# Patient Record
Sex: FEMALE | Race: WHITE | NOT HISPANIC OR LATINO | URBAN - METROPOLITAN AREA
[De-identification: names, ages, dates, MRNs, and addresses within clinical notes are randomized per-mention and may not be internally consistent; named-entity substitution may affect disease eponyms.]

---

## 2022-01-28 ENCOUNTER — OUTPATIENT (OUTPATIENT)
Dept: OUTPATIENT SERVICES | Facility: HOSPITAL | Age: 62
LOS: 1 days | Discharge: HOME | End: 2022-01-28
Payer: COMMERCIAL

## 2022-01-28 VITALS
TEMPERATURE: 98 F | RESPIRATION RATE: 18 BRPM | OXYGEN SATURATION: 97 % | WEIGHT: 269.18 LBS | DIASTOLIC BLOOD PRESSURE: 78 MMHG | HEART RATE: 77 BPM | HEIGHT: 68 IN | SYSTOLIC BLOOD PRESSURE: 156 MMHG

## 2022-01-28 DIAGNOSIS — S93.125A DISLOCATION OF METATARSOPHALANGEAL JOINT OF LEFT LESSER TOE(S), INITIAL ENCOUNTER: ICD-10-CM

## 2022-01-28 DIAGNOSIS — M79.672 PAIN IN LEFT FOOT: ICD-10-CM

## 2022-01-28 DIAGNOSIS — Z01.818 ENCOUNTER FOR OTHER PREPROCEDURAL EXAMINATION: ICD-10-CM

## 2022-01-28 DIAGNOSIS — Z96.641 PRESENCE OF RIGHT ARTIFICIAL HIP JOINT: Chronic | ICD-10-CM

## 2022-01-28 DIAGNOSIS — M20.12 HALLUX VALGUS (ACQUIRED), LEFT FOOT: ICD-10-CM

## 2022-01-28 DIAGNOSIS — M20.42 OTHER HAMMER TOE(S) (ACQUIRED), LEFT FOOT: ICD-10-CM

## 2022-01-28 DIAGNOSIS — Z96.642 PRESENCE OF LEFT ARTIFICIAL HIP JOINT: Chronic | ICD-10-CM

## 2022-01-28 DIAGNOSIS — Z98.51 TUBAL LIGATION STATUS: Chronic | ICD-10-CM

## 2022-01-28 LAB
ANION GAP SERPL CALC-SCNC: 2 MMOL/L — LOW (ref 5–17)
BUN SERPL-MCNC: 12 MG/DL — SIGNIFICANT CHANGE UP (ref 7–23)
CALCIUM SERPL-MCNC: 9.3 MG/DL — SIGNIFICANT CHANGE UP (ref 8.5–10.1)
CHLORIDE SERPL-SCNC: 108 MMOL/L — SIGNIFICANT CHANGE UP (ref 96–108)
CO2 SERPL-SCNC: 28 MMOL/L — SIGNIFICANT CHANGE UP (ref 22–31)
CREAT SERPL-MCNC: 0.53 MG/DL — SIGNIFICANT CHANGE UP (ref 0.5–1.3)
GLUCOSE SERPL-MCNC: 91 MG/DL — SIGNIFICANT CHANGE UP (ref 70–99)
HCT VFR BLD CALC: 34.5 % — SIGNIFICANT CHANGE UP (ref 34.5–45)
HGB BLD-MCNC: 11.5 G/DL — SIGNIFICANT CHANGE UP (ref 11.5–15.5)
MCHC RBC-ENTMCNC: 30.7 PG — SIGNIFICANT CHANGE UP (ref 27–34)
MCHC RBC-ENTMCNC: 33.3 G/DL — SIGNIFICANT CHANGE UP (ref 32–36)
MCV RBC AUTO: 92 FL — SIGNIFICANT CHANGE UP (ref 80–100)
NRBC # BLD: 0 /100 WBCS — SIGNIFICANT CHANGE UP (ref 0–0)
PLATELET # BLD AUTO: 245 K/UL — SIGNIFICANT CHANGE UP (ref 150–400)
POTASSIUM SERPL-MCNC: 4 MMOL/L — SIGNIFICANT CHANGE UP (ref 3.5–5.3)
POTASSIUM SERPL-SCNC: 4 MMOL/L — SIGNIFICANT CHANGE UP (ref 3.5–5.3)
RBC # BLD: 3.75 M/UL — LOW (ref 3.8–5.2)
RBC # FLD: 12.3 % — SIGNIFICANT CHANGE UP (ref 10.3–14.5)
SODIUM SERPL-SCNC: 138 MMOL/L — SIGNIFICANT CHANGE UP (ref 135–145)
WBC # BLD: 3.24 K/UL — LOW (ref 3.8–10.5)
WBC # FLD AUTO: 3.24 K/UL — LOW (ref 3.8–10.5)

## 2022-01-28 PROCEDURE — 93010 ELECTROCARDIOGRAM REPORT: CPT

## 2022-01-28 NOTE — H&P PST ADULT - HISTORY OF PRESENT ILLNESS
61 year old female with a past medical history of HTN c/o throbbing sharp pain to left foot, she is scheduled for a left foot Jose Alfredo/ Akin bunionectomy end digit weil osteotomy with fusions and flexor tendon transfer on 2/4/2022.    She denies fever, and sick contacts, and sick contacts.

## 2022-01-28 NOTE — H&P PST ADULT - NS NEC GEN PE MLT EXAM PC
[facility] [place] [facility]    [address]    [place] [address]    Phone:  [phone]       Thank You for choosing us for your health care visit. We are glad to serve you and happy to provide you with this summary of your visit. Please help us to ensure we have accurate records. If you find anything that needs to be changed, please let our staff know as soon as possible.          Your Demographic Information     Patient Name Sex Cassie Mathew Female 1954       Ethnic Group Patient Race    Not of  or  Origin White      Your Visit Details     Date & Time Provider Department    2017 3:30 PM Citlalli Anton NP [facility] [place] [facility]      Your Upcoming Appointment*(Max 10)       9:00 AM CDT   Complete Ophthalmology Exam with Shahla Vale OD   [facility] [place] [facility] ([facility])    [address]  [place] [id number] [phone]              3:00 PM CDT   Follow-up Visit with Le Campbell MD   [facility] ([facility])    [address]   [place] [address] [phone]            Wednesday May 31, 2017  8:40 AM CDT   Office Visit with Susie Pizano MD   [facility] [place] [facility] ([facility])    [address]  [place] [address] [phone]            2017  9:00 AM CDT   Behavioral Health Follow-Up with Kaia Verdugo MD   [facility] [place] [facility] ([facility] [place])    [address]  [place] [address]   [phone]            2017  7:05 AM CDT   Fasting Lab with [facility]   [facility] [place] [facility] ([facility])    [address]  [place] [address]   [phone]              8:30 AM CDT    No bruits; no thyromegaly or nodules   Office Visit with Citlalli Anton NP   [facility] [place] [facility] ([facility] [place])    [address]  [place] [address] [phone]              Your To Do List     Future Orders Please Complete On or Around Expires    COMPREHENSIVE METABOLIC PANEL  Jul 26, 2017 (Approximate) Aug 25, 2017    LIPID PANEL WITH REFLEX  Jul 26, 2017 (Approximate) Aug 25, 2017    Follow-Up    Return in about 6 months (around 7/27/2017) for follow up.      Conditions Discussed Today or Order-Related Diagnoses        Comments    Essential hypertension with goal blood pressure less than 140/90    -  Primary     Hypothyroidism due to Hashimoto's thyroiditis         Vitamin D deficiency         Mixed hyperlipidemia         Depressive disorder, not elsewhere classified         Mild intermittent asthma without complication         Obstructive sleep apnea (adult) (pediatric)         Irritable bowel syndrome without diarrhea         Gastroesophageal reflux disease, esophagitis presence not specified         Fibromyalgia         Sjogren's syndrome         Primary osteoarthritis involving multiple joints           Your Vitals Were     BP Pulse Resp Height Weight LMP    124/82 88 18 5' 6\" (1.676 m) 227 lb (103 kg) (LMP Unknown)    BMI Smoking Status                36.64 kg/m2 Never Smoker          Medications Prescribed or Re-Ordered Today     None      Your Current Medications Are        Disp Refills Start End    gabapentin (NEURONTIN) 100 MG capsule        Sig - Route: Take 100 mg by mouth 3 times daily. 200mg in am , 100mg after lunch, 100mg at bedtime - Oral    Class: Historical Med    vitamin - therapeutic multivitamins w/minerals (CENTRUM SILVER,THERA-M) Tab        Sig - Route: Take 1 tablet by mouth daily. - Oral    Class: Historical Med    lisinopril (PRINIVIL,ZESTRIL) 40 MG tablet 90 tablet 3 1/5/2017     Sig: TAKE 1 TABLET BY MOUTH DAILY    Class: Eprescribe    buPROPion (WELLBUTRIN)  100 MG tablet 75 tablet 2 1/4/2017     Sig: Take 1.5 in am and 1 tab nightly    Class: Eprescribe    sertraline (ZOLOFT) 100 MG tablet 135 tablet 1 1/4/2017     Sig - Route: Take 1.5 tablets by mouth daily. - Oral    Class: Eprescribe    LATUDA 40 MG tablet 30 tablet 2 12/6/2016     Sig: TAKE 1 TABLET BY MOUTH DAILY WITH BREAKFAST    Class: Eprescribe    levothyroxine (SYNTHROID, LEVOTHROID) 200 MCG tablet 90 tablet 1 12/2/2016     Sig - Route: Take 1 tablet by mouth daily. Brand name only - Oral    Class: Eprescribe    Valacyclovir HCl 1000 MG Tab 30 tablet 2 11/9/2016     Sig: TAKE 1 TABLET BY MOUTH DAILY    Class: Eprescribe    clobetasol (TEMOVATE) 0.05 % ointment 30 g 2 9/14/2016     Sig: Apply to affected area daily    Class: Eprescribe    cabergoline (DOSTINEX) 0.5 MG tablet 30 tablet 3 7/28/2016     Sig: Take 0.25 mg tablets by mouth twice weekly.    Class: Eprescribe    vitamin E 1000 UNITS capsule        Sig - Route: Take 1,000 Units by mouth daily. - Oral    Class: Historical Med    tiZANidine (ZANAFLEX) 4 MG tablet 90 tablet 1 7/26/2016     Sig - Route: Take 1 tablet by mouth every 6 hours as needed (spasm). - Oral    Class: Eprescribe    pravastatin (PRAVACHOL) 10 MG tablet 135 tablet 2 7/13/2016     Sig: TAKE 1 TABLET BY MOUTH DAILY ALTERNATING WITH 2 TABLETS BY MOUTH DAILY    Class: Eprescribe    simethicone (GAS-X) 80 MG chewable tablet 30 tablet 0 5/25/2016     Sig - Route: Chew 1 tablet by mouth every 6 hours as needed for Flatulence. - Oral    Class: Historical Med    docusate sodium (COLACE) 100 MG capsule 10 capsule 0 5/25/2016     Sig - Route: Take 1 capsule by mouth 2 times daily. - Oral    Class: Historical Med    hydroxychloroquine (PLAQUENIL) 200 MG tablet 180 tablet 3 5/25/2016     Sig - Route: Take 2 tablets by mouth daily. - Oral    Class: Eprescribe    verapamil 120 MG tablet 135 tablet 1 2/23/2016     Sig - Route: Take 240 mg by mouth nightly.  - Oral    Class: Historical Med    Notes  to Pharmacy: Per neurology    cetirizine (ZYRTEC) 10 MG tablet   4/8/2015     Sig - Route: Take 10 mg by mouth daily. Indications: as needed  - Oral    Class: Historical Med    albuterol (VENTOLIN HFA) 108 (90 BASE) MCG/ACT inhaler 1 Inhaler 0 11/14/2014     Sig - Route: Inhale 2 puffs into the lungs every 4 hours as needed for Shortness of Breath or Wheezing. - Inhalation    Class: Eprescribe    cholecalciferol (VITAMIN D3) 1000 UNITS tablet        Sig - Route: Take 2,000 Units by mouth daily. - Oral    Class: Historical Med    nortriptyline (PAMELOR) 25 MG capsule        Sig - Route: Take 50 mg by mouth 2 times daily. 2 tablets in the am and 2 tablets in the pm - Oral    Class: Historical Med    ibuprofen (MOTRIN) 600 MG tablet 15 tablet 0 1/23/2014     Sig - Route: Take 1 tablet by mouth every 8 hours as needed for Pain. - Oral    Class: Eprescribe    gabapentin (NEURONTIN) 300 MG capsule        Sig - Route: Take 300 mg by mouth 3 times daily. 1 tablet am, 1 tablet at lunchtime, 1 tablet at bedtime. - Oral    Class: Historical Med    DISPENSE        Sig - Route: Apply 1 application topically 3 times daily as needed. Fluocinonide 0.0.5% ointment. Apply topically up to three times daily as needed. - Topical    Class: Historical Med    phenylephrine (EQ SUPHEDRINE PE) 10 MG TABS        Sig - Route: Take 10 mg by mouth as needed. - Oral    Class: Historical Med    CALCIUM-MAGNESIUM-ZINC PO        Sig - Route: Take 2 tablets by mouth nightly. - Oral    Class: Historical Med    aspirin 81 MG tablet        Sig - Route: Take 81 mg by mouth daily. - Oral    Class: Historical Med    guaiFENesin (MUCINEX) 600 MG 12 hr tablet        Sig - Route: Take 1,200 mg by mouth as needed. - Oral    Class: Historical Med    metoCLOPramide (REGLAN) 10 MG tablet        Sig - Route: Take 10 mg by mouth 4 times daily as needed. - Oral    Class: Historical Med    Omega-3 Fatty Acids (OMEGA 3 PO)        Sig - Route: Take  by mouth. - Oral     Class: Historical Med    sumatriptan (IMITREX) 100 MG tablet        Sig - Route: Take 100 mg by mouth as needed. - Oral    Class: Historical Med      Allergies     Amitriptyline Other (See Comments)    Over sedated    Ceftin     Compazine ANXIETY    Hydrocodone Other (See Comments)    Doesn't remember    Sulfa Antibiotics Nausea & Vomiting      Immunizations History as of 1/27/2017     Name Date    INFLUENZA QUADRIVALENT 11/11/2015  9:23 AM, 10/8/2014  3:10 PM    Influenza 10/5/2016    Tdap 9/30/2013    Toradol 5/28/2014      Problem List as of 1/27/2017     Plaquenil 400mg daily since 10/2012    Abdominal pain    Allergic rhinitis    Asthma    Fibromyalgia    GERD (gastroesophageal reflux disease)    Hypersomnia    Intractable headache    Irritable bowel syndrome    Migraine    Personality disorder    Snoring    Astigmatism with presbyopia    Other and unspecified hyperlipidemia    Depressive disorder, not elsewhere classified    Sjogren's syndrome    Herpes simplex    Obstructive sleep apnea (adult) (pediatric)    Vitamin D deficiency    OA (osteoarthritis)    Perineal pain in female    Essential hypertension with goal blood pressure less than 140/90    Hypothyroidism due to Hashimoto's thyroiditis    Muscle weakness            Patient Instructions     None

## 2022-01-28 NOTE — H&P PST ADULT - CORNEAL ABRASION RISK
denies
Patient meets criteria for severe malnutrition in context of acute illness (gingivostomatitis)

## 2022-01-28 NOTE — H&P PST ADULT - NSICDXFAMILYHX_GEN_ALL_CORE_FT
FAMILY HISTORY:  Father  Still living? No  Family history of heart attack, Age at diagnosis: Age Unknown

## 2022-01-28 NOTE — H&P PST ADULT - PROBLEM SELECTOR PLAN 1
scheduled for a left foot Jose Alfredo/ Akin bunionectomy end digit weil osteotomy with fusions and flexor tendon transfer    medical clearance required    anesthesiologist to review PST labs, EKG, medical clearance and optimization for surgery scheduled for a left foot Jose Alfredo/ Akin bunionectomy end digit weil osteotomy with fusions and flexor tendon transfer    anesthesiologist to review PST labs, EKG, medical clearance and optimization for surgery

## 2022-01-28 NOTE — H&P PST ADULT - ASSESSMENT
61 year old female with a past medical history of HTN c/o throbbing sharp pain to left foot, she is scheduled for a left foot Jose Alfredo/ Akin bunionectomy end digit weil osteotomy with fusions and flexor tendon transfer on 2022.    CAPRINI SCORE [CLOT]    AGE RELATED RISK FACTORS                                                       MOBILITY RELATED FACTORS  [x ] Age 41-60 years                                            (1 Point)                  [ ] Bed rest                                                        (1 Point)  [ ] Age: 61-74 years                                           (2 Points)                 [ ] Plaster cast                                                   (2 Points)  [ ] Age= 75 years                                              (3 Points)                 [ ] Bed bound for more than 72 hours                 (2 Points)    DISEASE RELATED RISK FACTORS                                               GENDER SPECIFIC FACTORS  [ ] Edema in the lower extremities                       (1 Point)                  [ ] Pregnancy                                                     (1 Point)  [ ] Varicose veins                                               (1 Point)                  [ ] Post-partum < 6 weeks                                   (1 Point)             [ x] BMI > 25 Kg/m2                                            (1 Point)                  [ ] Hormonal therapy  or oral contraception          (1 Point)                 [ ] Sepsis (in the previous month)                        (1 Point)                  [ ] History of pregnancy complications                 (1 point)  [ ] Pneumonia or serious lung disease                                               [ ] Unexplained or recurrent                     (1 Point)           (in the previous month)                               (1 Point)  [ ] Abnormal pulmonary function test                     (1 Point)                 SURGERY RELATED RISK FACTORS  [ ] Acute myocardial infarction                              (1 Point)                 [ ]  Section                                             (1 Point)  [ ] Congestive heart failure (in the previous month)  (1 Point)               [ ] Minor surgery                                                  (1 Point)   [ ] Inflammatory bowel disease                             (1 Point)                 [ ] Arthroscopic surgery                                        (2 Points)  [ ] Central venous access                                      (2 Points)                x[ ] General surgery lasting more than 45 minutes   (2 Points)       [ ] Stroke (in the previous month)                          (5 Points)               [ ] Elective arthroplasty                                         (5 Points)                                                                                                                                               HEMATOLOGY RELATED FACTORS                                                 TRAUMA RELATED RISK FACTORS  [ ] Prior episodes of VTE                                     (3 Points)                [ ] Fracture of the hip, pelvis, or leg                       (5 Points)  [ ] Positive family history for VTE                         (3 Points)                 [ ] Acute spinal cord injury (in the previous month)  (5 Points)  [ ] Prothrombin 94303 A                                     (3 Points)                 [ ] Paralysis  (less than 1 month)                             (5 Points)  [ ] Factor V Leiden                                             (3 Points)                  [ ] Multiple Trauma within 1 month                        (5 Points)  [ ] Lupus anticoagulants                                     (3 Points)                                                           [ ] Anticardiolipin antibodies                               (3 Points)                                                       [ ] High homocysteine in the blood                      (3 Points)                                             [ ] Other congenital or acquired thrombophilia      (3 Points)                                                [ ] Heparin induced thrombocytopenia                  (3 Points)                                          Total Score [      4    ]    Caprini Score 0 - 2:  Low Risk, No VTE Prophylaxis required for most patients, encourage ambulation  Caprini Score 3 - 6:  At Risk, pharmacologic VTE prophylaxis is indicated for most patients (in the absence of a contraindication)  Caprini Score Greater than or = 7:  High Risk, pharmacologic VTE prophylaxis is indicated for most patients (in the absence of a contraindication)    Caprini score indicates that the patient is high risk for VTE event ( score 6 or greater). Surgical patient's in this group will benefit from both pharmacologic prophylaxis and intermittent compression devices . Surgical team will determine the balance between VTE  risk and bleeding risk and other clinical considerations

## 2022-01-28 NOTE — H&P PST ADULT - NSICDXPASTSURGICALHX_GEN_ALL_CORE_FT
PAST SURGICAL HISTORY:  History of left hip replacement Oct 2015    History of right hip replacement 2016    S/P tubal ligation 1995

## 2022-01-28 NOTE — H&P PST ADULT - NSICDXPASTMEDICALHX_GEN_ALL_CORE_FT
PAST MEDICAL HISTORY:  Hip pain, bilateral     Hypertension     Obesity     Primary osteoarthritis of left hip     Primary osteoarthritis of right hip

## 2022-01-28 NOTE — H&P PST ADULT - PROBLEM SELECTOR PLAN 3
scheduled for a left foot Jose Alfredo/ Akin bunionectomy end digit weil osteotomy with fusions and flexor tendon transfer    medical clearance required    anesthesiologist to review PST labs, EKG, medical clearance and optimization for surgery

## 2022-01-28 NOTE — H&P PST ADULT - NSANTHOSAYNRD_GEN_A_CORE
No. HAYDEE screening performed.  STOP BANG Legend: 0-2 = LOW Risk; 3-4 = INTERMEDIATE Risk; 5-8 = HIGH Risk

## 2022-02-03 ENCOUNTER — TRANSCRIPTION ENCOUNTER (OUTPATIENT)
Age: 62
End: 2022-02-03

## 2022-02-04 ENCOUNTER — RESULT REVIEW (OUTPATIENT)
Age: 62
End: 2022-02-04

## 2022-02-04 ENCOUNTER — OUTPATIENT (OUTPATIENT)
Dept: OUTPATIENT SERVICES | Facility: HOSPITAL | Age: 62
LOS: 1 days | Discharge: HOME | End: 2022-02-04
Payer: MEDICAID

## 2022-02-04 VITALS
HEART RATE: 57 BPM | RESPIRATION RATE: 15 BRPM | DIASTOLIC BLOOD PRESSURE: 64 MMHG | OXYGEN SATURATION: 96 % | SYSTOLIC BLOOD PRESSURE: 104 MMHG

## 2022-02-04 VITALS
HEART RATE: 84 BPM | WEIGHT: 266.98 LBS | TEMPERATURE: 98 F | SYSTOLIC BLOOD PRESSURE: 130 MMHG | HEIGHT: 68 IN | OXYGEN SATURATION: 98 % | DIASTOLIC BLOOD PRESSURE: 84 MMHG | RESPIRATION RATE: 20 BRPM

## 2022-02-04 DIAGNOSIS — Z96.642 PRESENCE OF LEFT ARTIFICIAL HIP JOINT: Chronic | ICD-10-CM

## 2022-02-04 DIAGNOSIS — Z96.641 PRESENCE OF RIGHT ARTIFICIAL HIP JOINT: Chronic | ICD-10-CM

## 2022-02-04 DIAGNOSIS — Z98.51 TUBAL LIGATION STATUS: Chronic | ICD-10-CM

## 2022-02-04 PROCEDURE — 73630 X-RAY EXAM OF FOOT: CPT | Mod: 26,LT

## 2022-02-04 DEVICE — IMPLANTABLE DEVICE: Type: IMPLANTABLE DEVICE | Status: FUNCTIONAL

## 2022-02-04 DEVICE — CLIP EZ 10MM: Type: IMPLANTABLE DEVICE | Status: FUNCTIONAL

## 2022-02-04 RX ORDER — SODIUM CHLORIDE 9 MG/ML
3 INJECTION INTRAMUSCULAR; INTRAVENOUS; SUBCUTANEOUS EVERY 8 HOURS
Refills: 0 | Status: DISCONTINUED | OUTPATIENT
Start: 2022-02-04 | End: 2022-02-04

## 2022-02-04 RX ORDER — FENTANYL CITRATE 50 UG/ML
25 INJECTION INTRAVENOUS
Refills: 0 | Status: DISCONTINUED | OUTPATIENT
Start: 2022-02-04 | End: 2022-02-04

## 2022-02-04 RX ORDER — SODIUM CHLORIDE 9 MG/ML
1000 INJECTION, SOLUTION INTRAVENOUS
Refills: 0 | Status: DISCONTINUED | OUTPATIENT
Start: 2022-02-04 | End: 2022-02-04

## 2022-02-04 RX ORDER — ACETAMINOPHEN 500 MG
1000 TABLET ORAL ONCE
Refills: 0 | Status: DISCONTINUED | OUTPATIENT
Start: 2022-02-04 | End: 2022-02-04

## 2022-02-04 RX ADMIN — SODIUM CHLORIDE 3 MILLILITER(S): 9 INJECTION INTRAMUSCULAR; INTRAVENOUS; SUBCUTANEOUS at 07:24

## 2022-02-04 NOTE — ASU DISCHARGE PLAN (ADULT/PEDIATRIC) - NS MD DC FALL RISK RISK
For information on Fall & Injury Prevention, visit: [email or web address] OR  [email or web address] OR  https://www.cdc.gov/steadi/patient.html

## 2022-02-04 NOTE — ASU PATIENT PROFILE, ADULT - MEDICATION ADMINISTRATION INFO, PROFILE
January 16, 2018    Radha Leahy [address] [address]    Dear Radha,  We are happy to inform you that your PAP smear result from 1/8/18 is normal.  We are now able to do a follow up test on PAP smears. The DNA test is for HPV (Human Papilloma Virus). Cervical cancer is closely linked with certain types of HPV. Your result showed no evidence of high risk HPV.  Therefore we recommend you return in 5 years for your next pap smear and HPV test.  You will still need to return to the clinic every year for an annual exam and other preventive tests.  Please contact the clinic at [phone] with any questions.  Sincerely,    MONA Wei CNP/rlm   no concerns

## 2022-02-04 NOTE — ASU DISCHARGE PLAN (ADULT/PEDIATRIC) - CARE PROVIDER_API CALL
Tonny Rush (DPM)  Podiatric Medicine and Surgery  [address] [place] [address]  Phone: [phone]  Fax: [phone]  Follow Up Time:

## 2022-02-04 NOTE — ASU DISCHARGE PLAN (ADULT/PEDIATRIC) - PROCEDURE
s/p left foot long arm Jose Alfredo bunionectomy with 2nd metatarsal head resection and 2nd hammertoe arthrodesis and percutaneous capsulotomies of lesser MPJ

## 2022-02-04 NOTE — ASU PATIENT PROFILE, ADULT - FALL HARM RISK - UNIVERSAL INTERVENTIONS
Bed in lowest position, wheels locked, appropriate side rails in place/Call bell, personal items and telephone in reach/Instruct patient to call for assistance before getting out of bed or chair/Non-slip footwear when patient is out of bed/[place] to call system/Physically safe environment - no spills, clutter or unnecessary equipment/Purposeful Proactive Rounding/Room/bathroom lighting operational, light cord in reach

## 2022-02-09 DIAGNOSIS — M20.42 OTHER HAMMER TOE(S) (ACQUIRED), LEFT FOOT: ICD-10-CM

## 2022-02-09 DIAGNOSIS — M20.12 HALLUX VALGUS (ACQUIRED), LEFT FOOT: ICD-10-CM

## 2022-02-09 DIAGNOSIS — Z96.643 PRESENCE OF ARTIFICIAL HIP JOINT, BILATERAL: ICD-10-CM
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
## 2022-02-09 DIAGNOSIS — E66.9 OBESITY, UNSPECIFIED: ICD-10-CM

## 2022-02-09 DIAGNOSIS — I10 ESSENTIAL (PRIMARY) HYPERTENSION: ICD-10-CM

## 2022-02-09 DIAGNOSIS — S93.125A DISLOCATION OF METATARSOPHALANGEAL JOINT OF LEFT LESSER TOE(S), INITIAL ENCOUNTER: ICD-10-CM
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
## 2022-02-24 ENCOUNTER — TRANSCRIPTION ENCOUNTER (OUTPATIENT)
Age: 62
End: 2022-02-24

## 2022-02-24 NOTE — ASU PATIENT PROFILE, ADULT - FALL HARM RISK - HARM RISK INTERVENTIONS

## 2022-02-25 ENCOUNTER — OUTPATIENT (OUTPATIENT)
Dept: OUTPATIENT SERVICES | Facility: HOSPITAL | Age: 62
LOS: 1 days | Discharge: HOME | End: 2022-02-25
Payer: MEDICAID

## 2022-02-25 ENCOUNTER — RESULT REVIEW (OUTPATIENT)
Age: 62
End: 2022-02-25

## 2022-02-25 VITALS
DIASTOLIC BLOOD PRESSURE: 71 MMHG | HEART RATE: 79 BPM | HEIGHT: 68 IN | WEIGHT: 266.98 LBS | TEMPERATURE: 98 F | SYSTOLIC BLOOD PRESSURE: 137 MMHG | OXYGEN SATURATION: 100 % | RESPIRATION RATE: 17 BRPM

## 2022-02-25 VITALS
DIASTOLIC BLOOD PRESSURE: 66 MMHG | HEART RATE: 64 BPM | OXYGEN SATURATION: 100 % | SYSTOLIC BLOOD PRESSURE: 120 MMHG | RESPIRATION RATE: 17 BRPM

## 2022-02-25 DIAGNOSIS — T84.84XA PAIN DUE TO INTERNAL ORTHOPEDIC PROSTHETIC DEVICES, IMPLANTS AND GRAFTS, INITIAL ENCOUNTER: ICD-10-CM

## 2022-02-25 DIAGNOSIS — Z96.642 PRESENCE OF LEFT ARTIFICIAL HIP JOINT: Chronic | ICD-10-CM

## 2022-02-25 DIAGNOSIS — Z96.641 PRESENCE OF RIGHT ARTIFICIAL HIP JOINT: Chronic | ICD-10-CM

## 2022-02-25 DIAGNOSIS — Z98.51 TUBAL LIGATION STATUS: Chronic | ICD-10-CM

## 2022-02-25 PROCEDURE — 73630 X-RAY EXAM OF FOOT: CPT | Mod: 26,LT

## 2022-02-25 PROCEDURE — 93010 ELECTROCARDIOGRAM REPORT: CPT

## 2022-02-25 DEVICE — K-WIRE WRIGHT MEDICAL (SINGLE TROCAR) BLUNT 1.1MM X 150MM: Type: IMPLANTABLE DEVICE | Site: LEFT | Status: FUNCTIONAL

## 2022-02-25 DEVICE — IMPLANTABLE DEVICE: Type: IMPLANTABLE DEVICE | Site: LEFT | Status: FUNCTIONAL

## 2022-02-25 DEVICE — SCREW CORT LO PROF 3.5X18MM: Type: IMPLANTABLE DEVICE | Site: LEFT | Status: FUNCTIONAL

## 2022-02-25 DEVICE — K-WIRE WRIGHT MEDICAL (SINGLE TROCAR) 1.6MM X 150MM: Type: IMPLANTABLE DEVICE | Site: LEFT | Status: FUNCTIONAL

## 2022-02-25 DEVICE — PIN FIXATION TEMP 1.1MM SM: Type: IMPLANTABLE DEVICE | Site: LEFT | Status: FUNCTIONAL

## 2022-02-25 DEVICE — SCREW CORT LO PROF 3.5X24MM: Type: IMPLANTABLE DEVICE | Site: LEFT | Status: FUNCTIONAL

## 2022-02-25 DEVICE — SCREW CORT LO PROF 3.5X20MM: Type: IMPLANTABLE DEVICE | Site: LEFT | Status: FUNCTIONAL

## 2022-02-25 RX ORDER — OXYCODONE HYDROCHLORIDE 5 MG/1
5 TABLET ORAL ONCE
Refills: 0 | Status: DISCONTINUED | OUTPATIENT
Start: 2022-02-25 | End: 2022-02-25

## 2022-02-25 RX ORDER — ONDANSETRON 8 MG/1
4 TABLET, FILM COATED ORAL ONCE
Refills: 0 | Status: DISCONTINUED | OUTPATIENT
Start: 2022-02-25 | End: 2022-03-11

## 2022-02-25 RX ORDER — OXYCODONE HYDROCHLORIDE 5 MG/1
2.5 TABLET ORAL ONCE
Refills: 0 | Status: DISCONTINUED | OUTPATIENT
Start: 2022-02-25 | End: 2022-02-25

## 2022-02-25 RX ORDER — OLMESARTAN MEDOXOMIL 5 MG/1
1 TABLET, FILM COATED ORAL
Qty: 0 | Refills: 0 | DISCHARGE

## 2022-02-25 NOTE — ASU DISCHARGE PLAN (ADULT/PEDIATRIC) - ASU DC SPECIAL INSTRUCTIONSFT
nonweightbearing to LLE w/ crutches  keep splint clean dry and intact  follow up in 1 week nonweightbearing to LLE w/ crutches  keep splint clean dry and intact  follow up in 1 week    You received tylenol in operating room You must not take tylenol until after 840

## 2022-02-25 NOTE — ASU DISCHARGE PLAN (ADULT/PEDIATRIC) - CARE PROVIDER_API CALL
Tonny Rush (DPM)  Podiatric Medicine and Surgery  [address] [place] [address]  Phone: [phone]  Fax: [phone]  Follow Up Time:

## 2022-02-25 NOTE — ASU DISCHARGE PLAN (ADULT/PEDIATRIC) - CALL YOUR DOCTOR IF YOU HAVE ANY OF THE FOLLOWING:
Bleeding that does not stop/Pain not relieved by Medications/Fever greater than (need to indicate Fahrenheit or Celsius)/Wound/Surgical Site with redness, or foul smelling discharge or pus/Numbness, tingling, color or temperature change to extremity/Nausea and vomiting that does not stop

## 2025-02-24 NOTE — H&P PST ADULT - SKIN
Patient states she is still experiencing some soreness and is wondering if she is still okay to go back to work.   No lesions; no rash

## (undated) DEVICE — LABELS BLANK W PEN

## (undated) DEVICE — DRILL BIT WRIGHT MEDICAL CANN 2.8MM

## (undated) DEVICE — DRSG STERISTRIPS 0.25 X 3"

## (undated) DEVICE — BOOT CAST CANVAS ROCKER CLOSED L NAVY

## (undated) DEVICE — DRILL BIT WRIGHT MEDICAL 2.5MM

## (undated) DEVICE — ELCTR BOVIE TIP BLADE INSULATED 2.8" EDGE WITH SAFETY

## (undated) DEVICE — DRSG CURITY GAUZE SPONGE 4 X 4" 12-PLY

## (undated) DEVICE — SUT VICRYL 3-0 27" SH UNDYED

## (undated) DEVICE — DRSG KERLIX ROLL 4.5"

## (undated) DEVICE — DRSG KLING 4"

## (undated) DEVICE — PACKING GAUZE PLAIN 2"

## (undated) DEVICE — TOURNIQUET ESMARK 6"

## (undated) DEVICE — SUT ETHILON 4-0 18" PS-2

## (undated) DEVICE — BLADE SURGICAL #15 CARBON

## (undated) DEVICE — SUT VICRYL 3-0 18" PS-2 UNDYED

## (undated) DEVICE — VENODYNE/SCD SLEEVE CALF MEDIUM

## (undated) DEVICE — FRAZIER SUCTION TIP 8FR

## (undated) DEVICE — PREP CHLORAPREP HI-LITE ORANGE 26ML

## (undated) DEVICE — SYR LUER LOK 10CC

## (undated) DEVICE — NDL HYPO REGULAR BEVEL 25G X 1.5" (BLUE)

## (undated) DEVICE — DRSG ACE BANDAGE 4" NS

## (undated) DEVICE — DRSG STOCKINETTE TUBULAR 6"

## (undated) DEVICE — DRILL BIT WRIGHT MEDICAL 2.0MM

## (undated) DEVICE — WARMING BLANKET FULL ADULT

## (undated) DEVICE — SUT VICRYL 4-0 18" PS-2 UNDYED

## (undated) DEVICE — DRAPE SURGICAL #1010

## (undated) DEVICE — LIJ-K WIRE TRAY (REQUIRES BILL) (IMPLANT): Type: DURABLE MEDICAL EQUIPMENT

## (undated) DEVICE — CANISTER DISPOSABLE THIN WALL 3000CC

## (undated) DEVICE — GLV 7.5 PROTEXIS (WHITE)

## (undated) DEVICE — ELCTR GROUNDING PAD ADULT COVIDIEN

## (undated) DEVICE — DRSG XEROFORM 1 X 8"

## (undated) DEVICE — DRSG STOCKINETTE IMPERVIOUS XL

## (undated) DEVICE — TOURNIQUET CUFF 18" DUAL PORT DUAL BLADDER W PLC (BLACK)

## (undated) DEVICE — PACK LIJ BASIC ORTHO

## (undated) DEVICE — HANDPIECE IRRIGATION W/ BATTERY PACKAND HIGH FLOW TIP